# Patient Record
Sex: MALE | Race: WHITE
[De-identification: names, ages, dates, MRNs, and addresses within clinical notes are randomized per-mention and may not be internally consistent; named-entity substitution may affect disease eponyms.]

---

## 2019-09-18 ENCOUNTER — HOSPITAL ENCOUNTER (EMERGENCY)
Dept: HOSPITAL 84 - D.ER | Age: 54
Discharge: HOME | End: 2019-09-18
Payer: COMMERCIAL

## 2019-09-18 VITALS — BODY MASS INDEX: 35.59 KG/M2 | HEIGHT: 73 IN | WEIGHT: 268.56 LBS

## 2019-09-18 VITALS — SYSTOLIC BLOOD PRESSURE: 117 MMHG | DIASTOLIC BLOOD PRESSURE: 79 MMHG

## 2019-09-18 DIAGNOSIS — R07.89: Primary | ICD-10-CM

## 2019-09-18 LAB
ALBUMIN SERPL-MCNC: 3.4 G/DL (ref 3.4–5)
ALP SERPL-CCNC: 83 U/L (ref 46–116)
ALT SERPL-CCNC: 28 U/L (ref 10–68)
ANION GAP SERPL CALC-SCNC: 11.9 MMOL/L (ref 8–16)
APTT BLD: 30.6 SECONDS (ref 22.8–39.4)
BILIRUB SERPL-MCNC: 0.49 MG/DL (ref 0.2–1.3)
BUN SERPL-MCNC: 20 MG/DL (ref 7–18)
CALCIUM SERPL-MCNC: 9.1 MG/DL (ref 8.5–10.1)
CHLORIDE SERPL-SCNC: 103 MMOL/L (ref 98–107)
CK MB SERPL-MCNC: 0.9 U/L (ref 0–3.6)
CK SERPL-CCNC: 57 UL (ref 21–232)
CO2 SERPL-SCNC: 26.3 MMOL/L (ref 21–32)
CREAT SERPL-MCNC: 1 MG/DL (ref 0.6–1.3)
D DIMER PPP FEU-MCNC: 0.37 UG/MLFEU (ref 0.2–0.54)
ERYTHROCYTE [DISTWIDTH] IN BLOOD BY AUTOMATED COUNT: 14 % (ref 11.5–14.5)
GLOBULIN SER-MCNC: 4.2 G/L
GLUCOSE SERPL-MCNC: 111 MG/DL (ref 74–106)
HCT VFR BLD CALC: 41.4 % (ref 42–54)
HGB BLD-MCNC: 14.7 G/DL (ref 13.5–17.5)
INR PPP: 1.06 (ref 0.85–1.17)
LYMPHOCYTES NFR BLD AUTO: 19.2 % (ref 15–50)
MAGNESIUM SERPL-MCNC: 1.9 MG/DL (ref 1.8–2.4)
MCH RBC QN AUTO: 28.5 PG (ref 26–34)
MCHC RBC AUTO-ENTMCNC: 35.5 G/DL (ref 31–37)
MCV RBC: 80.4 FL (ref 80–100)
NEUTROPHILS NFR BLD AUTO: 68.7 % (ref 40–80)
OSMOLALITY SERPL CALC.SUM OF ELEC: 277 MOSM/KG (ref 275–300)
PLATELET # BLD: 210 10X3/UL (ref 130–400)
PMV BLD AUTO: 8.8 FL (ref 7.4–10.4)
POTASSIUM SERPL-SCNC: 4.2 MMOL/L (ref 3.5–5.1)
PROT SERPL-MCNC: 7.6 G/DL (ref 6.4–8.2)
PROTHROMBIN TIME: 13.3 SECONDS (ref 11.6–15)
RBC # BLD AUTO: 5.15 10X6/UL (ref 4.2–6.1)
SODIUM SERPL-SCNC: 137 MMOL/L (ref 136–145)
TROPONIN I SERPL-MCNC: < 0.017 NG/ML (ref 0–0.06)
WBC # BLD AUTO: 6.2 10X3/UL (ref 4.8–10.8)

## 2019-09-20 ENCOUNTER — HOSPITAL ENCOUNTER (OUTPATIENT)
Dept: HOSPITAL 84 - D.HCCARDIO | Age: 54
Discharge: HOME | End: 2019-09-20
Attending: INTERNAL MEDICINE
Payer: COMMERCIAL

## 2019-09-20 VITALS — BODY MASS INDEX: 35.4 KG/M2

## 2019-09-20 DIAGNOSIS — R07.9: Primary | ICD-10-CM

## 2019-10-03 ENCOUNTER — HOSPITAL ENCOUNTER (OUTPATIENT)
Dept: HOSPITAL 84 - D.CATH | Age: 54
Discharge: HOME | End: 2019-10-03
Attending: INTERNAL MEDICINE
Payer: COMMERCIAL

## 2019-10-03 VITALS
HEIGHT: 73 IN | BODY MASS INDEX: 34.53 KG/M2 | WEIGHT: 260.55 LBS | HEIGHT: 73 IN | BODY MASS INDEX: 34.53 KG/M2 | WEIGHT: 260.55 LBS

## 2019-10-03 VITALS — SYSTOLIC BLOOD PRESSURE: 127 MMHG | DIASTOLIC BLOOD PRESSURE: 86 MMHG

## 2019-10-03 DIAGNOSIS — R07.9: Primary | ICD-10-CM

## 2019-10-03 DIAGNOSIS — R94.30: ICD-10-CM

## 2019-10-03 LAB
ALT SERPL-CCNC: 29 U/L (ref 10–68)
ANION GAP SERPL CALC-SCNC: 12.2 MMOL/L (ref 8–16)
BASOPHILS NFR BLD AUTO: 0.3 % (ref 0–2)
BUN SERPL-MCNC: 19 MG/DL (ref 7–18)
CALCIUM SERPL-MCNC: 8.8 MG/DL (ref 8.5–10.1)
CHLORIDE SERPL-SCNC: 104 MMOL/L (ref 98–107)
CHOLEST/HDLC SERPL: 4.7 RATIO (ref 2.3–4.9)
CO2 SERPL-SCNC: 26.9 MMOL/L (ref 21–32)
CREAT SERPL-MCNC: 0.8 MG/DL (ref 0.6–1.3)
EOSINOPHIL NFR BLD: 1.5 % (ref 0–7)
ERYTHROCYTE [DISTWIDTH] IN BLOOD BY AUTOMATED COUNT: 13.4 % (ref 11.5–14.5)
GLUCOSE SERPL-MCNC: 110 MG/DL (ref 74–106)
HCT VFR BLD CALC: 40.3 % (ref 42–54)
HDLC SERPL-MCNC: 26 MG/DL (ref 32–96)
HGB BLD-MCNC: 14.7 G/DL (ref 13.5–17.5)
IMM GRANULOCYTES NFR BLD: 0.1 % (ref 0–5)
LDL-HDL RATIO: 2.5 RATIO (ref 1.5–3.5)
LDLC SERPL-MCNC: 66 MG/DL (ref 0–100)
LYMPHOCYTES NFR BLD AUTO: 15.6 % (ref 15–50)
MCH RBC QN AUTO: 29.5 PG (ref 26–34)
MCHC RBC AUTO-ENTMCNC: 36.5 G/DL (ref 31–37)
MCV RBC: 80.8 FL (ref 80–100)
MONOCYTES NFR BLD: 9 % (ref 2–11)
NEUTROPHILS NFR BLD AUTO: 73.5 % (ref 40–80)
OSMOLALITY SERPL CALC.SUM OF ELEC: 280 MOSM/KG (ref 275–300)
PLATELET # BLD: 215 10X3/UL (ref 130–400)
PMV BLD AUTO: 9.4 FL (ref 7.4–10.4)
POTASSIUM SERPL-SCNC: 4.1 MMOL/L (ref 3.5–5.1)
RBC # BLD AUTO: 4.99 10X6/UL (ref 4.2–6.1)
SODIUM SERPL-SCNC: 139 MMOL/L (ref 136–145)
TRIGL SERPL-MCNC: 151 MG/DL (ref 30–200)
WBC # BLD AUTO: 9.1 10X3/UL (ref 4.8–10.8)

## 2019-10-03 NOTE — NUR
PT TO RESTROOM. VOIDED WITHOUT DIFFICULTY. TAKEN OUT TO VEHICLE BY
WHEELCHAIR. NO S/S OF DISTRESS NOTED. ALL BELONGINGS AND PAPERWORK IN
HAND.

## 2019-10-03 NOTE — NUR
VSS. RIGHT WRIST Z BAND IN PLACE. NO BLEEDING/HEMATOMA NOTED. CALL
LIGHT WITHIN REACH. NO NEEDS AT THIS TIME.

## 2019-10-03 NOTE — NUR
PT RESTING COMFORTABLY. VSS. RIGHT WRIST Z BAND IN PLACE. NO
BLEEDING/HEMATOMA NOTED. CALL LIGHT WITHIN REACH. WILL CONTINUE TO
MONITOR.

## 2019-10-03 NOTE — NUR
PIV D/C'D WITH CATH TIP INTACT. TOLERATED WELL. VSS. PT INSTRUCTED TO
GET UP AND DRESSED. RIGHT WRIST Z BAND REMOVED AND DRESSING APPLIED.
NO BLEEDING/HEMATOMA NOTED. FAMILY AT BEDSIDE TO ASSIST PT IN GETTING
DRESSED. RIGHT WRIST BRACE IN PLACE AND PT INSTRUCTED TO KEEP ON FOR
2 HOURS AFTER ARRIVAL HOME.

## 2019-10-03 NOTE — HEMODYNAMI
PATIENT:BRIELLE SARAVIA                             MEDICAL RECORD: [id number]
: [birth year]                                            LOCATION:[facility]          
ACCT# [id number]                                       ADMISSION DATE: 10/03/19
 
 
 Generatedon:10/3/91076:46
Patient name: BRIELLE SARAVIA Patient #: [id number] Visit #: [id number] SSN: :
 1965 Date of
study: 10/3/2019
Page: Of
Hemodynamic Procedure Report
****************************
Patient Data
Patient Demographics
Procedure consent was obtained
First Name: BRIELLE         Gender: Male
Last Name: MANJIT             : 1965
Middle Initial: DEX     Age: 54 year(s)
Patient #: [id number]       Race: Unknown
Visit #: [id number]
Accession #:
[id number]
Additional ID: [id number]
Contact details
Address: [address] [place] [address]        Phone: [phone]
State: [place]
City: [place]
Zip code: [address]
Past Medical History
Performed procedures and imaging results
Date     Procedure        Procedure Results      Comments
Stress testing   Positive->Intermediate
with SPECT MPI   risk
Allergies: No known allergies
Admission
Admission Data
Admission Date: 10/3/2019   Admission Time: 6:06
Arrival Date: 10/3/2019     Arrival Time: 0:00
Insurance Payor: Private
health insurance
[facility] #: [id number]
Height (in.): 72.83         BSA: 2.4 (m2)
Height (cm.): 185           BMI: 34.48 (kg/m2)
Weight (lbs.): 260.15
Weight (kg.): 118
Lab Results
Lab Result Date: 10/3/2019  Lab Result Time: 0:00
Biochemistry
Name         Units    Result                Min      Max
BUN          mg/dl    19       --(----)*-   7        18
Creatinine   mg/dl    0.8      --(-*--)--   0.6      1.3
eGFR         ml/min   90       --(*---)--   90       120
NONAFRICAN
CBC
Name         Units    Result                Min      Max
Hematocrit   %        40.3     -*(----)--   42       54
Hemoglobin   g/dl     14.7     --(-*--)--   13.5     17.5
Procedure
Procedure Types
 
Cath Procedure
Diagnostic Procedure
C
LHC w/Coronaries
Sedation Charges
Moderate Sedation up to 15 minutes
Procedure Description
Procedure Date
Procedure Date: 10/3/2019
Procedure Start Time: 8:21
Procedure End Time: 8:44
Procedure Staff
Name                            Function
Baldemar Lopez MD                   Performing Physician
Dianna Villafuerte RT               Monitor
Lexy Evans RT                Scrub
Renetta Quiroga RN                  Nurse
Indication
Nuclear stress test
Procedure Data
Cath Procedure
Fluoroscopy
Diagnostic fluoroscopy      Total fluoroscopy Time: 8
time: 8 min                 min
Diagnostic fluoroscopy      Total fluoroscopy dose:
dose: 1155 mGy              1155 mGy
Contrast Material
Contrast Material Type                       Amount (ml)
Isovue 300                                   126
Entry Location
Entry     Primary  Successful  Side  Size  Upsize Upsize Entry    Closure     Lyles
ccessful  Closure
Location                             (Fr)  1 (Fr) 2 (Fr) Remarks  Device        
          Remarks
Radial                         Right 6 Fr                         Mechanical
artery                               Short                        Compression
Estimated blood loss: 5 ml
Diagnostic catheters
Device Type               Used For           End Catheter
Placement
DIAGNOSTIC Kain 110cm    Procedure
5Fr catheter (484212)
DIAGNOSTIC Eola 4.5 5Fr  Procedure
catheter (527988)
DIAGNOSTIC AR1 MOD 5Fr    Procedure
catheter (616752Y)
DIAGNOSTIC Tiger 110cm 5  Procedure
Fr catheter (199493)
Procedure Complications
No complications
Procedure Medications
Medication           Administration Route Dosage
Oxygen               etCO2 Nasal cannula  2 l/min
Lidocaine 2%         added to field       20
Heparin Flush Bag    added to field       2 bags
(1000units/500ml NS)
0.9% NaCl            I.V.                 100 ml/hr
Radial Cocktail      I.A.                 1 syringe
(Verapamil 2mg/Nitro
400mcg/Heparin
 
1500units)
Versed               I.V.                 2 mg
Fentanyl             I.V.                 50 mcg
Versed               I.V.                 1 mg
Fentanyl             I.V.                 50 mcg
Hemodynamics
Rest
BSA: 2.4 (m2) HGB: 14.7 (g/dl) O2 Consumption: Estimated: 276.54 (ml/min) O2 Con
sumption indexed:
Estimated:115.22 (ml/min/m) Heart Rate: 60 (bpm)
Pressure Samples
Time     Site     Value (mmHg) Purpose      Heart      Use
Rate(bpm)
8:26     LV       110/-9,4     Snapshot     77
8:27     AO       103/65(79)   Pullback     78
8:27     LV       108/-5,9     Pullback     78
Gradients
Valve  Time  Site 1   Site 2     Mean    SEP/DFP    Peak To Heart  Use
(mmHg)  (sec/min)  Peak    Rate
(mmHg)  (bpm)
Aortic 8:27  LV       AO         5       4          5       78
108/-5,9 103/65(79)
Calculations
Valve    P-P      Mean      Valve     Index  Valve    Source
Name              Gradient  Area             Flow
(cm2)
Aortic   5        5
5        5
Snapshots
Pre Cath      Intra         NCS           Post Cath
Vital Signs
Time    Heart  Resp   SPO2 etCO2   NIBP       Rhythm  Pain    Sedation
Rate   (ipm)  (%)  (mmHg)  (mmHg)             Status  Level
(bpm)
8:09:29 62     13     93   37.5    120/78(87) NSR     0 (11)  10(A)
, No
pain
8:13:45 66     15     92   33      119/74(91) NSR     0 (11)  10(A)
, No
pain
8:19:00 66     10     95   32.3    113/75(88) NSR     0 (11)  10(A)
, No
pain
8:23:12 61     14     92   38.3    110/72(93) NSR     0 (11)  10(A)
, No
pain
8:27:24 76     14     94   27.7    106/68(80) NSR     0 (11)  9(A)
, No
pain
8:31:40 72     13     95   0       103/66(79) NSR     0 (11)  9(A)
, No
pain
8:35:56 62     17     94   36      112/65(88) NSR     0 (11)  9(A)
, No
pain
8:40:10 69     13     95   39.8    115/72(83) NSR     0 (11)  10(A)
, No
pain
8:44:24 62     13     94   39.1    108/71(82) NSR     0 (11)  10(A)
, No
 
pain
Medications
Time    Medication       Route   Dose    Verified Delivered Reason       Notes  
Effectiveness
by       by
8:08:46 Oxygen           etCO2   2 l/min Baldemar     Buffie    used for
Nasal           John Quiroga RN   procedure
cannula
8:08:53 Lidocaine 2%     added   20ml    Baldemar     Baldemar      for local
to      vial    John Lopez MD  anesthetic
field
8:08:58 Heparin Flush    added   2 bags  Baldemar     Baldemar      used for
Bag              to              John Lopez MD  procedure
(1000units/500ml field
NS)
8:09:08 0.9% NaCl        I.V.    100     Baldemar     Buffie    Per
ml/hr   John Quiroga RN   physician
8:17:31 Versed           I.V.    2 mg    Baldemar     Buffie    for sedation
John Quiroga RN
8:17:37 Fentanyl         I.V.    50 mcg  Baldemar     Buffie    for sedation
John Quiroga RN
8:24:15 Radial Cocktail  I.A.    1       Baldemar     Baldemar      for
(Verapamil               syringe John Lopez MD  vasodilation
2mg/Nitro
400mcg/Heparin
1500units)
8:27:10 Versed           I.V.    1 mg    Baldemar     Buffie    for sedation
John Quiroga RN
8:27:15 Fentanyl         I.V.    50 mcg  Baldemar     Jacksonie    for sedation
John Quiroga RN
Procedure Log
Time     Note
7:42:05  Renetta Quiroga RN sent for patient. Start room use.
7:42:11  Plan of Care:Hemodynamics will remain stable., Cardiac
rhythm will remain stable., Comfort level will be
maintained., Respiratory function will remain
adequate., Patient/ family verbilizes understanding of
procedure., Procedure tolerated without complication.,
Recovers from procedure without complications..
7:47:07  Procedure Status Elective Heart Cath (OP).
7:47:08  Time tracking: Regular hours (M-F 7:00 - 5:00)
7:47:12  Signed procedure consent form obtained from patient.
7:51:02  Patient Weight : 260.15 lbs
7:51:11  Arrival Date: 10/3/2019 12:00:00 AM
7:51:24  Insurance Payor : Private health insurance
7:51:44  Patient Height : 72.83 inches
7:52:12  Lab Result : Hemoglobin 14.7 g/dl
7:52:12  Lab Result : Hematocrit 40.3 %
7:52:12  Lab Result : eGFR NONAFRICAN 90 ml/min
7:52:12  Lab Result : BUN 19 mg/dl
7:52:12  Lab Result : Creatinine 0.8 mg/dl
7:54:15  Indication : Nuclear stress test
7:56:12  Patient received from Pre/Post Procedure Room to CCL 1
Alert and oriented. Tansferred to table in Supine
position.
7:56:13  Warm blankets applied, and marta hugger turned on for
patient comfort.
7:56:14  Correct patient and procedure confirmed by team.
7:56:14  ECG and BP/O2 sat monitors applied to patient.
8:08:17  Vital chart was started
 
8:08:20  Baseline sample Acquired.
8:08:24  Rhythm: sinus bradycardia
8:08:25  Full Disclosure recording started
8:08:28  H&P Date Dictated: 10/3/2019 New H&P dictated by
physician..
8:08:29  Pre-procedure instructions explained to patient.
8:08:29  Pre-op teaching completed and patient verbalized
understanding.
8:08:31  Family in patients room.
8:08:32  Patient NPO since Midnight.
8:08:41  Patient allergic to No known allergies
8:08:46  Oxygen 2 l/min etCO2 Nasal cannula was administered by
Renetta Quiroga RN; used for procedure; Verbal order read
back and verified.
8:08:53  Lidocaine 2% 20ml vial added to field was administered
by Baldemar Lopez MD; for local anesthetic; Verbal order
read back and verified.
8:08:53  Is patient on blood thinner?No
8:08:55  Patient diabetic? No.
8:08:57  Previous problem with sedation/anesthesia? No ?
8:08:58  Heparin Flush Bag (1000units/500ml NS) 2 bags added to
field was administered by Baldemar Lopez MD; used for
procedure; Verbal order read back and verified.
8:08:58  Snore? Yes
8:08:59  Sleep apnea? No
8:09:00  Deviated septum? No
8:09:02  Opens mouth fully? Yes
8:09:04  Sticks out tongue? Yes
8:09:06  Airway obstruction? No ?
8:09:08  0.9% NaCl 100 ml/hr I.V. was administered by Renetta Quiroga RN; Per physician; Verbal order read back and
verified.
8:09:08  Dentures? No ?
8:09:11  Modified Charlie's test Ulnar < 7 seconds
8:09:14  Pre procedure: right dorsailis pedis pulse 1+
Palpable, but thready & weak; easily obliterated
8:09:16  Patient pain scale 0/10 ?.
8:09:19  IV patent on arrival in left hand with 0.9% NaCl at
[facility].
8:09:22  Lab results completed and on chart.
8:09:28  Right Radial & Right Groin area was prepped with
chlora-prep and draped in sterile fashion
8:09:29  Alarms reviewed by R. N.
8:09:29  Sharps counted by scrub and verified by R.N.
8:09:33  Use device set Radial Dx or PCI
8:09:34  ACIST Syringe (42425) opened to sterile field.
8:09:35  Bag Decanter () opened to sterile field.
8:09:35  ACIST Hand Control (91740) opened to sterile field.
8:09:35  ACIST Manifold (68709) opened to sterile field.
8:09:36  Tegaderm 4 x 4 (1626W) opened to sterile field.
8:09:37  Medline Cath Pack ([id number]) opened to sterile field.
8:09:38  MBrace Wrist Support ([id number]) opened to sterile
field.
8:09:39  NEEDLE Cook 21G 4cm Radial (H11248) opened to sterile
field.
8:09:41  EMERALD Guide Wire (502-573) opened to sterile field.
8:09:41  SHEATH 6FR RAIN ([id number]) opened to sterile field.
8:13:21  Diagnostic Cath Status : Elective
8:13:21  PCI Cath Status : Elective
8:15:53  --------ALL STOP TIME OUT------
 
8:15:53  Final Timeout: patient, procedure, and site verified
with staff and physician. All members of the team are
in agreement.
8:15:56  Right Radial & Right Groin site verified by team.
8:15:59  Fire Safety Assessment: A--An alcohol-based skin
anteseptic being used preoperatively., C--Open oxygen
or nitrous oxide is being used., D--An ESU, laser, or
fiber-optic light is being used.
8:16:04  Physical assessment completed. ASA score P 2 - A
patient with mild systemic disease as per Baldemar Lopez MD.
8:16:06  1) 90+ Normal kidney functon but urine findings or
structural abnormalities or genetic trait point to
kidney disease.
8:16:08  Maximum allowable contrast dose (3.7 X eGFR X 0.75)250
ml.
8:16:12  Sedation plan: IV Moderate Sedation Medication:Versed,
Fentanyl
8:16:26  Zero performed for pressure channel P1
8:17:31  Versed 2 mg I.V. was administered by Renetta Quiroga RN;
for sedation; Verbal order read back and verified.
8:17:37  Fentanyl 50 mcg I.V. was administered by Renetta Quiroga
RN; for sedation; Verbal order read back and verified.
8:21:08  Procedure started.
8:21:39  Local anesthetic to right radial artery with Lidocaine
2% by Baldemar Lopez MD.**INITIAL ACCESS ONLY**
8:23:20  A 6 Fr Short sheath was inserted into the Right Radial
artery
8:24:10  A DIAGNOSTIC Kain 110cm 5Fr catheter ([id number]) was
advanced over the wire and used for Procedure.
8:24:15  Radial Cocktail (Verapamil 2mg/Nitro 400mcg/Heparin
1500units) 1 syringe I.A. was administered by Baldemar Lopez MD; for vasodilation; Verbal order read back and
verified.
8:25:53  LV gram done using NEGRETE
8::56  Injector settings: Ml/sec: 5, Volume: 15,
8:26:26  LV hemodynamics recorded.
8:26:50  EF : 60 %
8:27:10  Versed 1 mg I.V. was administered by Renetta Quiroga RN;
for sedation; Verbal order read back and verified.
8:27:15  Fentanyl 50 mcg I.V. was administered by Renetta Quiroga
RN; for sedation; Verbal order read back and verified.
8:28:38  Catheter exchanged over wire.
8:28:52  UNABLE TO ENGAGE LCA AND RCA
8:29:24  A DIAGNOSTIC Eola 4.5 5Fr catheter (560112) was
advanced over the wire and used for Procedure.
8:31:29  UNABLE TO ENGAGE
8:32:15  A DIAGNOSTIC AR1 MOD 5Fr catheter ([id number]) was
advanced over the wire and used for Procedure.
8:33:26  RCA angiography performed.
8:33:50  Catheter exchanged over wire.
8:35:08  A DIAGNOSTIC Tiger 110cm 5 Fr catheter (367116) was
advanced over the wire and used for Procedure.
8:36:34  LCA angiography performed.
8:40:59  Catheter removed.
8:41:02  Procedure ended.(Physican Out)
8:42:17  ZEPHYR REGULAR TR BAND (661340) opened to sterile
field.
8:42:23  Sheath removed intact; hemostasis achieved with
Mechanical Compression to the Right Radial artery.
 
8:42:28  Fluoroscopy time 08.00 minutes.
8:42:31  Fluoroscopy dose: 1155 mGy
8:42:31  Flurop Dose total: 1155
8:42:37  Dose Area Product 99620 mGy/cm.
8:42:52  Contrast amount:Isovue 300 126ml.
8:42:56  Maximum allowable dose exceeded? No.
8:42:57  Sharps counted by scrub and verified by R.N.
8:43:00  Carson band inflated with 10cc of air.
8:43:03  Post-procedure physical assessment completed. ASA
score P 2 - A patient with mild systemic disease as
per Baldemar Lopez MD.
8:43:06  Post procedure rhythm: sinus rhythm
8:43:08  Estimated blood loss: 5 ml
8:43:09  Post procedure instruction explained to
patient.Patient verbalizes understanding.
8:43:09  Patient needs reinforcement of post procedure
teaching.
8:43:30  Procedure type changed to Cath procedure, Diagnostic
procedure, LHC, LHC w/Coronaries, Sedation Charges,
Moderate Sedation up to 15 minutes
8:44:08  Procedure and supply charges have been captured,
reviewed, submitted and are correct.
8:44:11  Procedure Complication : No complications
8:44:12  Vital chart was stopped
8:44:13  See physician's report for complete and final results.
8:44:16  Report given to Pre/Post Procedure Room.
8:44:18  Patient transfered to Pre/Post Procedure Room with
Bed.
8:44:20  Procedure ended.
8:44:20  Full Disclosure recording stopped
8:44:26  End room use (Document Last)
8:45:56  End room use (Document Last)
8:46:18  End room use (Document Last)
Device Usage
Item Name   Manufacture  Quantity  Catalog      Hospital Part     Current Minima
l Lot# /
Number       Charge   Number   Stock   Stock   Serial#
Code
ACIST       Acist        1         38575        157603   367500   518081  20
Syringe     Medical
(82564)     Systems Inc
Bag         Microtek     1                 301208   58066    330028  5
Decanter    Medical Inc.
()
ACIST Hand  Acist        1         95215        641103   938202   343594  5
Control     Medical
(73117)     Systems Inc
ACIST       Acist        1         19394        493428   270938   834846  5
Manifold    Medical
(66351)     Systems Inc
Tegaderm 4  3M           1         1626W        756014   875430   718029  5
x 4 (1626W)
Medline     Medline      1         [id number]    010714   37455    213051  5
Cath Pack
([id number])
MBrace      Advanced     1         140-0250-00  280331   41609    333683  5
Wrist       Vascular
Support     Dynamics
([id number])
NEEDLE [facility] 1         M00438       385669   033819   276909  5
 
21G 4cm
Radial
(K33349)
EMERALD     Cardinal     1         502-455      418298   711858   422933  5
Guide Wire  Health
(502-455)
SHEATH 6FR  Cardinal     1         4746786      789898   1341503  958003  5
[facility]        Health
(0794516)
DIAGNOSTIC  Terumo       1               297818   962465   110874  5
Kain 110cm
5Fr
catheter
(531506)
DIAGNOSTIC  Terumo       1               323927   929546   338464  5
Tiger 4.5
5Fr
catheter
(815504)
DIAGNOSTIC  Cardinal     1         834633E      551061   992121   328357  15
AR1 MOD 5Fr Health
catheter
(306986Z)
DIAGNOSTIC  Terumo       1               037853   407342   440942  5
Tiger 110cm
5 Fr
catheter
(014690)
ZEPHYR      Cardinal     1         483068       999005   7995158  882914  5
REGULAR TR  Health
BAND
(754411)
Signature Audit [place]
Stage           Time        Signature      Unsigned
Intra-Procedure 10/3/2019   Dianna Villafuerte
8:45:56 AM  RT(R)
Intra-Procedure 10/3/2019   Renetta Quiroga RN
8:46:18 AM
Intra-Procedure 10/3/2019   Baldemar Lopez MD
8:46:40 AM
 
 
 
 
 
 
 
 
 
 
 
 
 
 
 
[facility]                                          
[address] [place] [address]

## 2019-10-03 NOTE — NUR
4cc OF AIR REMOVED FROM Z BAND. NO BLEEDING/HEMATOMA NOTED. PT EATING
SANDWICH. DENIES NAUSEA/PAIN AT THIS TIME. CALL LIGHT WITHIN REACH.

## 2019-10-03 NOTE — NUR
4cc OF AIR REMOVED FROM Z BAND. NO BLEEDING/HEMATOMA NOTED. CALL
LIGHT WITHIN REACH. PT SITTING UP IN BED. SET UP WITH SANDWICH TRAY
AND DRINK. O2 SAT 96% ON ROOM AIR. /80. HR 82.

## 2019-10-03 NOTE — NUR
PT ARRIVED BY STRETCHER. PLACED ON MONITORS. ASSESSMENT COMPLETED.
VSS. FAMILY AT BEDSIDE. DR. BARKER AT BEDSIDE AND UPDATED PT'S FAMILY.